# Patient Record
Sex: FEMALE | Race: BLACK OR AFRICAN AMERICAN | NOT HISPANIC OR LATINO | Employment: FULL TIME | URBAN - METROPOLITAN AREA
[De-identification: names, ages, dates, MRNs, and addresses within clinical notes are randomized per-mention and may not be internally consistent; named-entity substitution may affect disease eponyms.]

---

## 2021-07-01 ENCOUNTER — PATIENT MESSAGE (OUTPATIENT)
Dept: ADMINISTRATIVE | Facility: OTHER | Age: 36
End: 2021-07-01

## 2021-07-08 ENCOUNTER — HOSPITAL ENCOUNTER (EMERGENCY)
Facility: HOSPITAL | Age: 36
Discharge: HOME | End: 2021-07-08
Attending: EMERGENCY MEDICINE
Payer: COMMERCIAL

## 2021-07-08 VITALS
DIASTOLIC BLOOD PRESSURE: 67 MMHG | BODY MASS INDEX: 31.65 KG/M2 | TEMPERATURE: 98 F | HEART RATE: 85 BPM | SYSTOLIC BLOOD PRESSURE: 101 MMHG | OXYGEN SATURATION: 98 % | RESPIRATION RATE: 16 BRPM | WEIGHT: 190 LBS | HEIGHT: 65 IN

## 2021-07-08 DIAGNOSIS — L03.211 FACIAL CELLULITIS: Primary | ICD-10-CM

## 2021-07-08 LAB
B-HCG UR QL: NEGATIVE
CTP QC/QA: YES

## 2021-07-08 PROCEDURE — 63600175 PHARM REV CODE 636 W HCPCS: Performed by: EMERGENCY MEDICINE

## 2021-07-08 PROCEDURE — 25000003 PHARM REV CODE 250: Performed by: EMERGENCY MEDICINE

## 2021-07-08 PROCEDURE — 96372 THER/PROPH/DIAG INJ SC/IM: CPT

## 2021-07-08 PROCEDURE — 99284 EMERGENCY DEPT VISIT MOD MDM: CPT | Mod: 25

## 2021-07-08 PROCEDURE — 81025 URINE PREGNANCY TEST: CPT | Performed by: EMERGENCY MEDICINE

## 2021-07-08 RX ORDER — CEPHALEXIN 250 MG/1
500 CAPSULE ORAL
Status: COMPLETED | OUTPATIENT
Start: 2021-07-08 | End: 2021-07-08

## 2021-07-08 RX ORDER — CEPHALEXIN 500 MG/1
500 CAPSULE ORAL EVERY 6 HOURS
Qty: 28 CAPSULE | Refills: 0 | Status: SHIPPED | OUTPATIENT
Start: 2021-07-08 | End: 2021-07-15

## 2021-07-08 RX ORDER — KETOROLAC TROMETHAMINE 10 MG/1
10 TABLET, FILM COATED ORAL EVERY 6 HOURS
Qty: 8 TABLET | Refills: 0 | Status: SHIPPED | OUTPATIENT
Start: 2021-07-08 | End: 2021-07-10

## 2021-07-08 RX ORDER — MUPIROCIN 20 MG/G
OINTMENT TOPICAL 2 TIMES DAILY
COMMUNITY
Start: 2021-07-06

## 2021-07-08 RX ORDER — KETOROLAC TROMETHAMINE 30 MG/ML
30 INJECTION, SOLUTION INTRAMUSCULAR; INTRAVENOUS
Status: COMPLETED | OUTPATIENT
Start: 2021-07-08 | End: 2021-07-08

## 2021-07-08 RX ORDER — DOXYCYCLINE 100 MG/1
100 CAPSULE ORAL 2 TIMES DAILY
COMMUNITY
Start: 2021-07-06

## 2021-07-08 RX ADMIN — KETOROLAC TROMETHAMINE 30 MG: 30 INJECTION, SOLUTION INTRAMUSCULAR; INTRAVENOUS at 08:07

## 2021-07-08 RX ADMIN — CEPHALEXIN 500 MG: 250 CAPSULE ORAL at 08:07

## 2021-10-28 ENCOUNTER — OFFICE VISIT (OUTPATIENT)
Dept: URGENT CARE | Facility: CLINIC | Age: 36
End: 2021-10-28
Payer: COMMERCIAL

## 2021-10-28 VITALS
BODY MASS INDEX: 31.65 KG/M2 | HEIGHT: 65 IN | WEIGHT: 190 LBS | HEART RATE: 80 BPM | DIASTOLIC BLOOD PRESSURE: 72 MMHG | OXYGEN SATURATION: 98 % | TEMPERATURE: 98 F | SYSTOLIC BLOOD PRESSURE: 109 MMHG | RESPIRATION RATE: 18 BRPM
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
## 2021-10-28 DIAGNOSIS — M25.511 ACUTE PAIN OF RIGHT SHOULDER: ICD-10-CM

## 2021-10-28 DIAGNOSIS — M54.2 NECK PAIN: ICD-10-CM

## 2021-10-28 PROCEDURE — 3078F DIAST BP <80 MM HG: CPT | Mod: CPTII,S$GLB,, | Performed by: STUDENT IN AN ORGANIZED HEALTH CARE EDUCATION/TRAINING PROGRAM

## 2021-10-28 PROCEDURE — 3078F PR MOST RECENT DIASTOLIC BLOOD PRESSURE < 80 MM HG: ICD-10-PCS | Mod: CPTII,S$GLB,, | Performed by: STUDENT IN AN ORGANIZED HEALTH CARE EDUCATION/TRAINING PROGRAM

## 2021-10-28 PROCEDURE — 3008F BODY MASS INDEX DOCD: CPT | Mod: CPTII,S$GLB,, | Performed by: STUDENT IN AN ORGANIZED HEALTH CARE EDUCATION/TRAINING PROGRAM

## 2021-10-28 PROCEDURE — 99203 PR OFFICE/OUTPT VISIT, NEW, LEVL III, 30-44 MIN: ICD-10-PCS | Mod: S$GLB,,, | Performed by: STUDENT IN AN ORGANIZED HEALTH CARE EDUCATION/TRAINING PROGRAM

## 2021-10-28 PROCEDURE — 3074F SYST BP LT 130 MM HG: CPT | Mod: CPTII,S$GLB,, | Performed by: STUDENT IN AN ORGANIZED HEALTH CARE EDUCATION/TRAINING PROGRAM

## 2021-10-28 PROCEDURE — 72040 XR CERVICAL SPINE 2 OR 3 VIEWS: ICD-10-PCS | Mod: S$GLB,,, | Performed by: RADIOLOGY

## 2021-10-28 PROCEDURE — 72040 X-RAY EXAM NECK SPINE 2-3 VW: CPT | Mod: S$GLB,,, | Performed by: RADIOLOGY

## 2021-10-28 PROCEDURE — 1159F PR MEDICATION LIST DOCUMENTED IN MEDICAL RECORD: ICD-10-PCS | Mod: CPTII,S$GLB,, | Performed by: STUDENT IN AN ORGANIZED HEALTH CARE EDUCATION/TRAINING PROGRAM

## 2021-10-28 PROCEDURE — 3074F PR MOST RECENT SYSTOLIC BLOOD PRESSURE < 130 MM HG: ICD-10-PCS | Mod: CPTII,S$GLB,, | Performed by: STUDENT IN AN ORGANIZED HEALTH CARE EDUCATION/TRAINING PROGRAM

## 2021-10-28 PROCEDURE — 1160F PR REVIEW ALL MEDS BY PRESCRIBER/CLIN PHARMACIST DOCUMENTED: ICD-10-PCS | Mod: CPTII,S$GLB,, | Performed by: STUDENT IN AN ORGANIZED HEALTH CARE EDUCATION/TRAINING PROGRAM

## 2021-10-28 PROCEDURE — 1160F RVW MEDS BY RX/DR IN RCRD: CPT | Mod: CPTII,S$GLB,, | Performed by: STUDENT IN AN ORGANIZED HEALTH CARE EDUCATION/TRAINING PROGRAM

## 2021-10-28 PROCEDURE — 3008F PR BODY MASS INDEX (BMI) DOCUMENTED: ICD-10-PCS | Mod: CPTII,S$GLB,, | Performed by: STUDENT IN AN ORGANIZED HEALTH CARE EDUCATION/TRAINING PROGRAM

## 2021-10-28 PROCEDURE — 99203 OFFICE O/P NEW LOW 30 MIN: CPT | Mod: S$GLB,,, | Performed by: STUDENT IN AN ORGANIZED HEALTH CARE EDUCATION/TRAINING PROGRAM

## 2021-10-28 PROCEDURE — 1159F MED LIST DOCD IN RCRD: CPT | Mod: CPTII,S$GLB,, | Performed by: STUDENT IN AN ORGANIZED HEALTH CARE EDUCATION/TRAINING PROGRAM

## 2021-10-28 RX ORDER — METHOCARBAMOL 750 MG/1
750 TABLET, FILM COATED ORAL EVERY 6 HOURS PRN
Qty: 40 TABLET | Refills: 0 | Status: SHIPPED | OUTPATIENT
Start: 2021-10-28

## 2021-10-28 RX ORDER — IBUPROFEN 800 MG/1
800 TABLET ORAL EVERY 8 HOURS PRN
Qty: 20 TABLET | Refills: 0 | Status: SHIPPED | OUTPATIENT
Start: 2021-10-28 | End: 2023-09-13 | Stop reason: SDUPTHER

## 2022-06-23 ENCOUNTER — NURSE TRIAGE (OUTPATIENT)
Dept: ADMINISTRATIVE | Facility: CLINIC | Age: 37
End: 2022-06-23
Payer: COMMERCIAL

## 2022-06-23 NOTE — TELEPHONE ENCOUNTER
OOC outgoing call     - Pt c/o covid, symptoms amplyfy at night, nausea, cough ibuprofen, quarantine past 7 day, head and chest congestion, body aches, flu like symptoms. Covid protocol followed and pt advised to use [facility] marga to contact a [facility] doc for further tx her pcp is a [facility] doc [place]. Instructed Pt to go to an UC or ER if she cannot see a virtual doc today or her own pcp at [facility]. OAC offered and accepted. Unable to route encounter to non [place] pcp. Pt alert and oriented, education provided on how to tx symptoms at home but to contact OAC for further care. Pt agrees. Invited Pt to call ooc at 1 [phone] in the future if any new or worsening symptoms, or questions arise. Some criteria given for example on when to call ooc back.    Reason for Disposition   [1] Continuous (nonstop) coughing interferes with work or school AND [2] no improvement using cough treatment per Care Advice    Additional Information   Negative: SEVERE difficulty breathing (e.g., struggling for each breath, speaks in single words)   Negative: Difficult to awaken or acting confused (e.g., disoriented, slurred speech)   Negative: Bluish (or gray) lips or face now   Negative: Shock suspected (e.g., cold/pale/clammy skin, too weak to stand, low BP, rapid pulse)   Negative: Sounds like a life-threatening emergency to the triager   Negative: SEVERE or constant chest pain or pressure  (Exception: Mild central chest pain, present only when coughing.)   Negative: MODERATE difficulty breathing (e.g., speaks in phrases, SOB even at rest, pulse 100-120)   Negative: Headache and stiff neck (can't touch chin to chest)   Negative: Oxygen level (e.g., pulse oximetry) 90 percent or lower   Negative: Chest pain or pressure   Negative: Patient sounds very sick or weak to the triager   Negative: MILD difficulty breathing (e.g., minimal/no SOB at rest, SOB with walking, pulse <100)   Negative: Fever > 103 F (39.4 C)   Negative:  [1] Fever > 101 F (38.3 C) AND [2] over 60 years of age   Negative: [1] Fever > 100.0 F (37.8 C) AND [2] bedridden (e.g., nursing home patient, CVA, chronic illness, recovering from surgery)   Negative: HIGH RISK for severe COVID complications (e.g., weak immune system, age > 64 years, obesity with BMI > 25, pregnant, chronic lung disease or other chronic medical condition) (Exception: Already seen by PCP and no new or worsening symptoms.)   Negative: [1] HIGH RISK patient AND [2] influenza is widespread in the community AND [3] ONE OR MORE respiratory symptoms: cough, sore throat, runny or stuffy nose   Negative: [1] HIGH RISK patient AND [2] influenza exposure within the last 7 days AND [3] ONE OR MORE respiratory symptoms: cough, sore throat, runny or stuffy nose   Negative: Oxygen level (e.g., pulse oximetry) 91 to 94 percent   Negative: [1] COVID-19 infection suspected by caller or triager AND [2] mild symptoms (cough, fever, or others) AND [3] negative COVID-19 rapid test   Negative: Fever present > 3 days (72 hours)   Negative: [1] Fever returns after gone for over 24 hours AND [2] symptoms worse or not improved    Protocols used: CORONAVIRUS (COVID-19) DIAGNOSED OR [id number]

## 2023-09-13 ENCOUNTER — HOSPITAL ENCOUNTER (EMERGENCY)
Facility: HOSPITAL | Age: 38
Discharge: HOME | End: 2023-09-13
Attending: EMERGENCY MEDICINE
Payer: COMMERCIAL

## 2023-09-13 VITALS
OXYGEN SATURATION: 97 % | TEMPERATURE: 98 F | WEIGHT: 191.81 LBS | HEIGHT: 66 IN | BODY MASS INDEX: 30.82 KG/M2 | DIASTOLIC BLOOD PRESSURE: 68 MMHG | RESPIRATION RATE: 22 BRPM | HEART RATE: 90 BPM | SYSTOLIC BLOOD PRESSURE: 125 MMHG

## 2023-09-13 DIAGNOSIS — M25.511 ACUTE PAIN OF RIGHT SHOULDER: ICD-10-CM

## 2023-09-13 DIAGNOSIS — M54.2 NECK PAIN: ICD-10-CM

## 2023-09-13 DIAGNOSIS — S83.421A SPRAIN OF LATERAL COLLATERAL LIGAMENT OF RIGHT KNEE, INITIAL ENCOUNTER: Primary | ICD-10-CM

## 2023-09-13 DIAGNOSIS — M25.561 RIGHT KNEE PAIN: ICD-10-CM

## 2023-09-13 LAB
B-HCG UR QL: NEGATIVE
CTP QC/QA: YES

## 2023-09-13 PROCEDURE — 81025 URINE PREGNANCY TEST: CPT | Performed by: EMERGENCY MEDICINE

## 2023-09-13 PROCEDURE — 99283 EMERGENCY DEPT VISIT LOW MDM: CPT

## 2023-09-13 RX ORDER — IBUPROFEN 800 MG/1
800 TABLET ORAL EVERY 8 HOURS PRN
Qty: 20 TABLET | Refills: 0 | Status: SHIPPED | OUTPATIENT
Start: 2023-09-13

## 2023-09-14 NOTE — ED PROVIDER NOTES
"Encounter Date: 9/13/2023       History     Chief Complaint   Patient presents with    Knee Pain     Patient c/o right knee pain after feeling a "pop" in knee while playing.  Pain worse to outer aspect of knee.     HPI 37-year-old woman who presents emergency department for evaluation of acute onset of right knee pain after hearing a pop when she twisted her leg rough-housing with her family members.  Review of patient's allergies indicates:   Allergen Reactions    Bactrim [sulfamethoxazole-trimethoprim] Hives     No past medical history on file.  Past Surgical History:   Procedure Laterality Date    ECTOPIC PREGNANCY SURGERY       No family history on file.  Social History     Tobacco Use    Smoking status: Never    Smokeless tobacco: Never   Substance Use Topics    Alcohol use: No    Drug use: Never     Review of Systems   Constitutional:  Negative for fever.   HENT:  Negative for sore throat.    Respiratory:  Negative for shortness of breath.    Cardiovascular:  Negative for chest pain.   Gastrointestinal:  Negative for nausea.   Genitourinary:  Negative for dysuria.   Musculoskeletal:  Positive for arthralgias and gait problem. Negative for back pain.   Skin:  Negative for rash.   Neurological:  Negative for weakness.   Hematological:  Does not bruise/bleed easily.       Physical Exam     Initial Vitals [09/13/23 2105]   BP Pulse Resp Temp SpO2   125/68 90 (!) 22 97.9 °F (36.6 °C) 97 %      MAP       --         Physical Exam    Nursing note and vitals reviewed.  Constitutional: She appears well-developed and well-nourished. No distress.   HENT:   Head: Normocephalic and atraumatic.   Eyes: EOM are normal. Pupils are equal, round, and reactive to light.   Neck: Neck supple.   Pulmonary/Chest: No respiratory distress.   Musculoskeletal:         General: Normal range of motion.      Cervical back: Neck supple.      Comments: Patient with tenderness over the right lateral collateral ligament/proximal fibula.  There " is no appreciable swelling, bruising or erythema.  She has full range of motion although extending the knee causes increased pain.  I do not appreciate any laxity on varus/valgus stress.  Normal anterior/posterior drawer test.  She has normal sensation with normal pulses his sleep.     Neurological: She is alert and oriented to person, place, and time.   Skin: Skin is warm and dry.         ED Course   Procedures  Labs Reviewed   POCT URINE PREGNANCY - Normal          Imaging Results              X-Ray Knee 3 View Right (In process)                      Medications - No data to display  Medical Decision Making  37-year-old woman presents emergency department for evaluation of sudden onset of lateral right knee pain while rough-housing with her family members.  Patient states she felt a pop and had sudden pain.  On examination she is tender over the LCL of the right knee.  I do not appreciate any laxity on varus or valgus stress as well as negative anterior/posterior test.  Low suspicion for PCL/ACL tear.  She is neurovascularly intact.  X-rays are obtained showed no evidence of fracture or dislocation.  Patient is placed in knee immobilizer and provided crutches.  Suspect knee sprain, possible LCL injury.  Referred to PCP and Orthopedics.  NSAIDs for pain.    Amount and/or Complexity of Data Reviewed  Labs: ordered.  Radiology: ordered.    Risk  Prescription drug management.                               Clinical Impression:   Final diagnoses:  [M25.561] Right knee pain  [S83.421A] Sprain of lateral collateral ligament of right knee, initial encounter (Primary)        ED Disposition Condition    Discharge Stable          ED Prescriptions       Medication Sig Dispense Start Date End Date Auth. Provider    ibuprofen (ADVIL,MOTRIN) 800 MG tablet Take 1 tablet (800 mg total) by mouth every 8 (eight) hours as needed for Pain. 20 tablet 9/13/2023 -- Edin Ross MD          Follow-up Information       Follow up With  Specialties Details Why Contact Info Additional Information    Jessica Nava MD Obstetrics Schedule an appointment as soon as possible for a visit in 2 weeks  [address]   [place] [address]  [phone]       [facility] Emergency Medicine  As needed, If symptoms worsen [address] [place] [address] [address] [place] [address] 1st floor             Edin Ross MD  09/13/23 5902

## 2024-01-22 ENCOUNTER — OFFICE VISIT (OUTPATIENT)
Dept: INTERNAL MEDICINE | Facility: CLINIC | Age: 39
End: 2024-01-22
Payer: COMMERCIAL

## 2024-01-22 VITALS
DIASTOLIC BLOOD PRESSURE: 78 MMHG | BODY MASS INDEX: 33.72 KG/M2 | SYSTOLIC BLOOD PRESSURE: 112 MMHG | HEART RATE: 100 BPM | WEIGHT: 202.38 LBS | OXYGEN SATURATION: 95 % | HEIGHT: 65 IN

## 2024-01-22 DIAGNOSIS — N39.0 RECURRENT UTI: ICD-10-CM

## 2024-01-22 DIAGNOSIS — Z00.00 ANNUAL VISIT FOR GENERAL ADULT MEDICAL EXAMINATION WITHOUT ABNORMAL FINDINGS: Primary | ICD-10-CM

## 2024-01-22 DIAGNOSIS — M25.512 ACUTE PAIN OF LEFT SHOULDER: ICD-10-CM

## 2024-01-22 PROCEDURE — 90715 TDAP VACCINE 7 YRS/> IM: CPT | Mod: S$GLB,,, | Performed by: INTERNAL MEDICINE

## 2024-01-22 PROCEDURE — 3078F DIAST BP <80 MM HG: CPT | Mod: CPTII,S$GLB,,

## 2024-01-22 PROCEDURE — 3074F SYST BP LT 130 MM HG: CPT | Mod: CPTII,S$GLB,,

## 2024-01-22 PROCEDURE — 90471 IMMUNIZATION ADMIN: CPT | Mod: S$GLB,,, | Performed by: INTERNAL MEDICINE

## 2024-01-22 PROCEDURE — 99385 PREV VISIT NEW AGE 18-39: CPT | Mod: 25,S$GLB,,

## 2024-01-22 PROCEDURE — 99999 PR PBB SHADOW E&M-EST. PATIENT-LVL IV: CPT | Mod: PBBFAC,,,

## 2024-01-22 PROCEDURE — 3008F BODY MASS INDEX DOCD: CPT | Mod: CPTII,S$GLB,,

## 2024-01-22 RX ORDER — NITROFURANTOIN 25; 75 MG/1; MG/1
100 CAPSULE ORAL 2 TIMES DAILY
Qty: 14 CAPSULE | Refills: 0 | Status: SHIPPED | OUTPATIENT
Start: 2024-01-22 | End: 2024-01-29

## 2024-01-22 NOTE — PROGRESS NOTES
"Deena Mcdonald  1985        Subjective     Reason for Visit:    History of Present Illness:  Ms. Deena Mcdonald is a 38 y.o. female with a history pre-eclampsia  who presents to clinic for establishing of care, and further evaluation of re-current UTI and left sided shoulder pain. \    Recurrent UTI - states that as a child she had a surgery on a ureter to open to allow for proper flow of urine. States her whole life has had recurrent UTI. States she gets 3-4 UTI in a year. Notices recurrence occurring after sexual intercourse, she does use the restroom.     Left upper back pain- Started last week, more prominent on Saturday, has not occurred in the past. Throbbing, nagging pain. 5-6/10 at it's worse and at times would feel piercing. Took aspirin regular- helped a little. However, continued to have pain and pain radiated to back, and naging pain, tingling. Then she took baby aspirin and iron pill, and then took ibuprofen which helped and has since improved     Family History:   Mother and Siblings:Healthy  Maternal Grandparent: Grandmother passed from colon cancer, 64     Social history:  Occupation: Works in admin at the [employer]  [employer] health: "Stressed a little bit, kids and work"  Safety: Feels safe   Diet: Breakfast: omlette/eggs/cerna/bread Lunch: usually skips lunch Dinner: red beans/rice/jumbalaya. Salty food.   Exercise: Tore ACL on right, seeing at [facility]- recommending physical therapy and not surgical intervention at this time. Has not been going to physical therapy.   Smoking: denies  Alcohol: denies  Illicit drug use: denies  Sexual History: Currently sexually active, been diagnosed with herpes- treated. Last OBGYN appointment was last week- no abnormal PAP smears.    HEALTH MAINTENANCE:  - Lipid panel: will obtain  - Cervical cancer screen: Patient says she will share [facility] records    VACCINATIONS:  - Tdap- will obtain at this visit       Review of Systems " "  Constitutional:  Negative for chills and fever.   Eyes:  Negative for blurred vision.   Respiratory:  Negative for cough and shortness of breath.    Cardiovascular:  Negative for chest pain and leg swelling.   Gastrointestinal:  Negative for abdominal pain, diarrhea, nausea and vomiting.   Genitourinary:  Negative for dysuria.   Musculoskeletal:  Positive for back pain. Negative for myalgias.   Neurological:  Negative for sensory change, weakness and headaches.   Psychiatric/Behavioral:  The patient is not nervous/anxious.         PAST HISTORY:     No past medical history on file.    Past Surgical History:   Procedure Laterality Date    ECTOPIC PREGNANCY SURGERY         No family history on file.    Social History     Socioeconomic History    Marital status:    Tobacco Use    Smoking status: Never    Smokeless tobacco: Never   Substance and Sexual Activity    Alcohol use: No    Drug use: Never    Sexual activity: Yes       MEDICATIONS & ALLERGIES:     Current Outpatient Medications on File Prior to Visit   Medication Sig    doxycycline (VIBRAMYCIN) 100 MG Cap Take 100 mg by mouth 2 (two) times daily.    ibuprofen (ADVIL,MOTRIN) 800 MG tablet Take 1 tablet (800 mg total) by mouth every 8 (eight) hours as needed for Pain.    methocarbamoL (ROBAXIN) 750 MG Tab Take 1 tablet (750 mg total) by mouth every 6 (six) hours as needed (muscle spasms).    mupirocin (BACTROBAN) 2 % ointment Apply topically 2 (two) times daily.     No current facility-administered medications on file prior to visit.       Review of patient's allergies indicates:   Allergen Reactions    Bactrim [sulfamethoxazole-trimethoprim] Hives       OBJECTIVE:        Vital Signs:  Vitals:    01/22/24 1545   BP: 112/78   Pulse: 100   SpO2: 95%   Weight: 91.8 kg (202 lb 6.1 oz)   Height: 5' 5" (1.651 m)       Body mass index is 33.68 kg/m².     Physical Exam:  Physical Exam  Vitals reviewed.   Constitutional:       General: She is not in acute " "distress.     Appearance: Normal appearance. She is not ill-appearing.   HENT:      Head: Normocephalic and atraumatic.      Mouth/Throat:      Mouth: Mucous membranes are moist.   Eyes:      Extraocular Movements: Extraocular movements intact.      Conjunctiva/sclera: Conjunctivae normal.      Pupils: Pupils are equal, round, and reactive to light.   Cardiovascular:      Rate and Rhythm: Normal rate and regular rhythm.      Pulses: Normal pulses.      Heart sounds: Normal heart sounds.   Pulmonary:      Effort: Pulmonary effort is normal. No respiratory distress.      Breath sounds: Normal breath sounds. No wheezing.   Abdominal:      General: Abdomen is flat. Bowel sounds are normal. There is no distension.      Palpations: Abdomen is soft.      Tenderness: There is no abdominal tenderness. There is no guarding.   Musculoskeletal:      Right lower leg: No edema.      Left lower leg: No edema.   Skin:     General: Skin is warm.      Capillary Refill: Capillary refill takes less than 2 seconds.   Neurological:      General: No focal deficit present.      Mental Status: She is oriented to person, place, and time.      Motor: No weakness.   Psychiatric:         Mood and Affect: Mood normal.         Behavior: Behavior normal.            Laboratory  No results found for: "WBC", "HGB", "HCT", "MCV", "PLT"  No results found for: "GLU", "NA", "K", "CL", "CO2", "BUN", "CREATININE", "CALCIUM", "MG", "PROT", "BILITOT", "ALKPHOS", "ALT", "AST"  No results found for: "INR", "PROTIME"  No results found for: "CHOL", "HDL", "LDLCALC", "TRIG"  No results found for: "HGBA1C"  No results found for: "TSH"  No results for input(s): "POCTGLUCOSE" in the last 72 hours.       Health Maintenance         Date Due Completion Date    Hepatitis C Screening Never done ---    Cervical Cancer Screening Never done ---    HIV Screening Never done ---    Hemoglobin A1c (Diabetic Prevention Screening) Never done ---    TETANUS VACCINE 01/22/2034 " 1/22/2024                ASSESSMENT & PLAN:       Ms. Deena Mcdonald is a 38 y.o. female who was seen today in clinic for establishing care and had concerns for recurrent UTI, states she had some type of urological procedure as a child for UTIs however she continued to have recurrent UTIs. Mostly notable after coitus. Will prescribe postcoital nitrofurantoin and referral for urology. Patient says she will transfer her records [facility].     Deena was seen today for establish care.    Diagnoses and all orders for this visit:    Annual visit for general adult medical examination without abnormal findings  -     CBC Auto Differential; Future  -     Comprehensive Metabolic Panel; Future  -     Hemoglobin A1C; Future  -     Lipid Panel; Future  -     TSH; Future  -     Hepatitis C Antibody; Future  -     HIV 1/2 Ag/Ab (4th Gen); Future  -     (In Office Administered) Tdap Vaccine    Recurrent UTI  -     Ambulatory referral/consult to Urology; Future  -     nitrofurantoin, macrocrystal-monohydrate, (MACROBID) 100 MG capsule; Take 1 capsule (100 mg total) by mouth 2 (two) times daily. for 7 days    Acute pain of left shoulder  -     Ambulatory referral/consult to Medical Massage Therapy; Future  -     Ambulatory referral/consult to Physical/Occupational Therapy; Future      1. Annual visit for general adult medical examination without abnormal findings    2. Recurrent UTI    3. Acute pain of left shoulder        No follow-ups on file.    Other Orders Placed This Visit:  Orders Placed This Encounter   Procedures    (In Office Administered) Tdap Vaccine    CBC Auto Differential    Comprehensive Metabolic Panel    Hemoglobin A1C    Lipid Panel    TSH    Hepatitis C Antibody    HIV 1/2 Ag/Ab (4th Gen)    Ambulatory referral/consult to Medical Massage Therapy    Ambulatory referral/consult to Physical/Occupational Therapy    Ambulatory referral/consult to Urology           Discussed with Dr. Ugarte - staff  attestation to follow    Vanessa Pelaez MD  Internal Medicine, PGY-2  01/23/2024.3:49 PM  [facility]  Internal Medicine Resident Clinic  [address] [place] [address]  [phone]  [email or web address]

## 2024-02-06 ENCOUNTER — TELEPHONE (OUTPATIENT)
Dept: INTERNAL MEDICINE | Facility: CLINIC | Age: 39
End: 2024-02-06
Payer: COMMERCIAL

## 2024-02-06 NOTE — TELEPHONE ENCOUNTER
----- Message from Narendra Renee sent at 2/6/2024  8:29 AM CST -----  Contact: [phone]@patient  Good morning patient would like a call back to discuss getting a apt to remove stiches on 2/14. Please give patient a call back [phone]

## 2024-02-14 ENCOUNTER — OFFICE VISIT (OUTPATIENT)
Dept: INTERNAL MEDICINE | Facility: CLINIC | Age: 39
End: 2024-02-14
Payer: COMMERCIAL

## 2024-02-14 VITALS
HEART RATE: 72 BPM | OXYGEN SATURATION: 95 % | SYSTOLIC BLOOD PRESSURE: 99 MMHG | HEIGHT: 66 IN | WEIGHT: 199.06 LBS | DIASTOLIC BLOOD PRESSURE: 76 MMHG | BODY MASS INDEX: 31.99 KG/M2

## 2024-02-14 DIAGNOSIS — S51.811A LACERATION OF RIGHT FOREARM, INITIAL ENCOUNTER: Primary | ICD-10-CM

## 2024-02-14 PROCEDURE — 99999 PR PBB SHADOW E&M-EST. PATIENT-LVL III: CPT | Mod: PBBFAC,,,

## 2024-02-14 PROCEDURE — 3074F SYST BP LT 130 MM HG: CPT | Mod: CPTII,S$GLB,,

## 2024-02-14 PROCEDURE — 3008F BODY MASS INDEX DOCD: CPT | Mod: CPTII,S$GLB,,

## 2024-02-14 PROCEDURE — 3078F DIAST BP <80 MM HG: CPT | Mod: CPTII,S$GLB,,

## 2024-02-14 PROCEDURE — 99213 OFFICE O/P EST LOW 20 MIN: CPT | Mod: S$GLB,,,

## 2024-02-14 NOTE — PROGRESS NOTES
"    [facility]  RESIDENT CONTINUITY CLINIC NOTE    Name: Deena Mcdonald  : 1985  MRN: [id number]  Date of Service: 2024   PCP: Vanessa Pelaez MD        Assessment and Plan:       Laceration of right forearm, initial encounter  -     Stitches removed; appears to be healing well but given the depth of the initial injury and her complaint of "feeling something pulling on the inside", I'm placing an ambulatory referral/consult to Orthopedics; Future; Expected date: 2024      Preventative Health: Per PCP      HPI:           Deena Mcdonald is a 38 y.o. female with no significant medical history who presents to clinic for stitch removal.  Her R forearm just proximal to the wrist was lacerated on  on a broken light bulb on the side of her [employer] [place] float upon her pulling her hand up after reaching down to hand someone some beads.  The initial appearance is depicted below.  [facility] and she says they applied a cream-based antibiotic (she thinks it was mupirocin).  The pain has been tolerable but she still feels "discomfort on the inside" of her arm when moving it.    Review of systems as above; all unmentioned systems are negative.     PEX:     Vitals:    24 1453   BP: 99/76   BP Location: Left arm   Patient Position: Sitting   BP Method: Medium (Manual)   Pulse: 72   SpO2: 95%   Weight: 90.3 kg (199 lb 1.2 oz)   Height: 5' 5.5" (1.664 m)     Body mass index is 32.62 kg/m².    Physical exam: R hand and wrist neurovascularly intact.  Oropharynx is clear.  No adenopathy.  Lungs CTA.  No murmurs, gallops, or rubs.  No abdominal tenderness.  No MAJOR.    :      :            Other pertinent data from chart that formed part of my MDM:             Current Outpatient Medications:     doxycycline (VIBRAMYCIN) 100 MG Cap, Take 100 mg by mouth 2 (two) times daily., Disp: , Rfl:     " "ibuprofen (ADVIL,MOTRIN) 800 MG tablet, Take 1 tablet (800 mg total) by mouth every 8 (eight) hours as needed for Pain., Disp: 20 tablet, Rfl: 0    methocarbamoL (ROBAXIN) 750 MG Tab, Take 1 tablet (750 mg total) by mouth every 6 (six) hours as needed (muscle spasms)., Disp: 40 tablet, Rfl: 0    mupirocin (BACTROBAN) 2 % ointment, Apply topically 2 (two) times daily., Disp: , Rfl:    No past medical history on file.  Past Surgical History:   Procedure Laterality Date    ECTOPIC PREGNANCY SURGERY       No family history on file.  Social History     Socioeconomic History    Marital status:    Tobacco Use    Smoking status: Never    Smokeless tobacco: Never   Substance and Sexual Activity    Alcohol use: No    Drug use: Never    Sexual activity: Yes       Labs: Previous labs reviewed.  No results found for: "WBC", "HGB", "HCT", "MCV", "PLT"      No results found for: "NA", "K", "CL", "CO2", "GLU", "BUN", "CREATININE", "CALCIUM", "PROT", "ALBUMIN", "BILITOT", "ALKPHOS", "AST", "ALT", "ANIONGAP", "EGFRNORACEVR"    Imaging: Previous imaging reviewed.     Discussed with Dr. Gonzalez, further recommendations as per attending addendum. Please feel free to contact me with any questions or concerns.    Guillermo Jackson MD MPH  Resident Continuity Clinic, PGY-III  [facility] [address] [address]  [phone]      "

## 2024-02-15 ENCOUNTER — TELEPHONE (OUTPATIENT)
Dept: INTERNAL MEDICINE | Facility: CLINIC | Age: 39
End: 2024-02-15
Payer: COMMERCIAL

## 2024-02-15 DIAGNOSIS — M79.631 RIGHT FOREARM PAIN: Primary | ICD-10-CM

## 2024-02-15 NOTE — PROGRESS NOTES
Preceptor note    Patient's history and physical discussed, please refer to resident physician's note for specific details. Pt seen and examined with resident physician. Wound examined after stitches removed. Overall appears to be healing well except one area where skin is has not yet closed. Medical record reviewed. I agree with resident's assessment and plan.

## 2024-02-19 ENCOUNTER — OFFICE VISIT (OUTPATIENT)
Dept: UROLOGY | Facility: CLINIC | Age: 39
End: 2024-02-19
Payer: COMMERCIAL

## 2024-02-19 VITALS
SYSTOLIC BLOOD PRESSURE: 126 MMHG | BODY MASS INDEX: 33.57 KG/M2 | HEART RATE: 76 BPM | HEIGHT: 65 IN | WEIGHT: 201.5 LBS | DIASTOLIC BLOOD PRESSURE: 70 MMHG | RESPIRATION RATE: 18 BRPM

## 2024-02-19 DIAGNOSIS — N39.0 RECURRENT UTI: ICD-10-CM

## 2024-02-19 PROCEDURE — 3008F BODY MASS INDEX DOCD: CPT | Mod: CPTII,S$GLB,, | Performed by: NURSE PRACTITIONER

## 2024-02-19 PROCEDURE — 3074F SYST BP LT 130 MM HG: CPT | Mod: CPTII,S$GLB,, | Performed by: NURSE PRACTITIONER

## 2024-02-19 PROCEDURE — 1159F MED LIST DOCD IN RCRD: CPT | Mod: CPTII,S$GLB,, | Performed by: NURSE PRACTITIONER

## 2024-02-19 PROCEDURE — 99203 OFFICE O/P NEW LOW 30 MIN: CPT | Mod: S$GLB,,, | Performed by: NURSE PRACTITIONER

## 2024-02-19 PROCEDURE — 3078F DIAST BP <80 MM HG: CPT | Mod: CPTII,S$GLB,, | Performed by: NURSE PRACTITIONER

## 2024-02-19 PROCEDURE — 1160F RVW MEDS BY RX/DR IN RCRD: CPT | Mod: CPTII,S$GLB,, | Performed by: NURSE PRACTITIONER

## 2024-02-19 RX ORDER — NITROFURANTOIN MACROCRYSTALS 50 MG/1
50 CAPSULE ORAL NIGHTLY
Qty: 90 CAPSULE | Refills: 1 | Status: SHIPPED | OUTPATIENT
Start: 2024-02-19 | End: 2024-08-17

## 2024-02-19 NOTE — PROGRESS NOTES
"Subjective:      Deena Mcdonald is a 38 y.o. female who was referred by Dr. Pelaez for evaluation of her recurrent UTIs.      Recurrent UTIs  Patient complains of recurrent urinary tract infections.  These have been occuring for >20 years and she reports 5 infections in the past year.  Of her recent infections,  all  are culture proven, including unknown organism.  She describes associated symptoms during these episodes as dysuria, frequency, urgency and L flank pain. Often with gross hematuria.  She denies associated fever. She reports that symptoms improve with antibiotic treatment.  The timing of her infections is not related to intercourse.  Other treatments have included none antibiotics.  Other urologic history includes- urethral procedure as a child (dilation?).     She denies urinary symptoms today. Denies gross hematuria.     The following portions of the patient's history were reviewed and updated as appropriate: allergies, current medications, past family history, past medical history, past social history, past surgical history and problem list.    Review of Systems  Constitutional: no fever or chills  ENT: no nasal congestion or sore throat  Respiratory: no cough or shortness of breath  Cardiovascular: no chest pain or palpitations  Gastrointestinal: no nausea or vomiting, tolerating diet  Genitourinary: as per HPI  Hematologic/Lymphatic: no easy bruising or lymphadenopathy  Musculoskeletal: no arthralgias or myalgias  Neurological: no seizures or tremors  Behavioral/Psych: no auditory or visual hallucinations     Objective:   Vital Signs:/70 (BP Location: Left arm, Patient Position: Sitting, BP Method: Medium (Automatic))   Pulse 76   Resp 18   Ht 5' 5" (1.651 m)   Wt 91.4 kg (201 lb 8 oz)   LMP 02/12/2024 (Exact Date)   BMI 33.53 kg/m²     Physical Exam   General: alert and oriented, no acute distress  Head: normocephalic, atraumatic  Neck: supple, normal ROM  Respiratory: Symmetric " "expansion, non-labored breathing  Cardiovascular: regular rate and rhythm  Abdomen: soft, non tender, non distended  Pelvic: deferred  Skin: normal coloration and turgor, no rashes, no suspicious skin lesions noted  Neuro: alert and oriented x3, no gross deficits  Psych: normal judgment and insight, normal mood/affect, and non-anxious    Lab Review   Urinalysis demonstrates negative for all components  No results found for: "WBC", "HGB", "HCT", "MCV", "PLT"  No results found for: "CREATININE", "BUN"    Imaging   None    Assessment:     1. Recurrent UTI      Plan:   Diagnoses and all orders for this visit:    Recurrent UTI  -     Ambulatory referral/consult to Urology  -     Urine culture  -     Urine culture; Standing  -     US Retroperitoneal Complete; Future  -     Cystoscopy; Future  -     nitrofurantoin (MACRODANTIN) 50 MG capsule; Take 1 capsule (50 mg total) by mouth every evening.    Plan:  Discussed various etiologies for recurrent UTIs as well as the difference between recurrent and persistent UTIs. Explained that without her outside records, I'm unable to determine which is the case with her.  Renal US to r/o hydro and/or stones.  Will send urine culture today and treat as indicated  Expressed importance of obtaining culture any time she thinks she has UTI - standing order placed for UCx   Discussed preventive measures including adequate hydration, d-mannose/probiotic, regular voiding, and voiding after intercourse.  Discussed prophylactic antibiotics, which she wishes to start. Prescription provided forMacrodantin 50mg daily.  FU after US for cysto      "

## 2024-02-29 ENCOUNTER — TELEPHONE (OUTPATIENT)
Dept: ORTHOPEDICS | Facility: CLINIC | Age: 39
End: 2024-02-29
Payer: COMMERCIAL

## 2024-02-29 NOTE — TELEPHONE ENCOUNTER
LVM c pt. Attempting to confirm appt location & time c Dr. Patel   with XR. Requested a call back to the [facility] at [phone] with any questions, concerns or need for a different appointment time.

## 2024-03-01 ENCOUNTER — TELEPHONE (OUTPATIENT)
Dept: UROLOGY | Facility: CLINIC | Age: 39
End: 2024-03-01
Payer: COMMERCIAL

## 2024-03-04 ENCOUNTER — TELEPHONE (OUTPATIENT)
Dept: UROLOGY | Facility: CLINIC | Age: 39
End: 2024-03-04
Payer: COMMERCIAL

## 2024-03-05 ENCOUNTER — TELEPHONE (OUTPATIENT)
Dept: UROLOGY | Facility: CLINIC | Age: 39
End: 2024-03-05
Payer: COMMERCIAL

## 2024-03-05 NOTE — TELEPHONE ENCOUNTER
Appt r/s.  ----- Message from Hang Palma sent at 3/4/2024  2:27 PM CST -----  Name Of Caller: Deena        Provider Name: Juan Alberto Coburn         Does patient feel the need to be seen today? no        Relationship to the Pt?: patient        Contact Preference?:[phone]         What is the nature of the call?:Patient has a cystoscopy scheduled for today 3- at 2:30pm, patient states that she needs to cancel this appointment and get it rescheduled.

## 2024-03-13 ENCOUNTER — TELEPHONE (OUTPATIENT)
Dept: ORTHOPEDICS | Facility: CLINIC | Age: 39
End: 2024-03-13
Payer: COMMERCIAL

## 2024-03-13 NOTE — TELEPHONE ENCOUNTER
I called the patient and reminded them of their appointment on 03/19/2024 at 3:45 p.m. I informed them where they need to go and to arrive 45 minutes before their appointment for their XR. The patient understood and agreed.

## 2024-04-10 ENCOUNTER — TELEPHONE (OUTPATIENT)
Dept: ORTHOPEDICS | Facility: CLINIC | Age: 39
End: 2024-04-10
Payer: COMMERCIAL

## 2024-04-16 ENCOUNTER — OFFICE VISIT (OUTPATIENT)
Dept: ORTHOPEDICS | Facility: CLINIC | Age: 39
End: 2024-04-16
Payer: COMMERCIAL

## 2024-04-16 ENCOUNTER — HOSPITAL ENCOUNTER (OUTPATIENT)
Dept: RADIOLOGY | Facility: OTHER | Age: 39
Discharge: HOME | End: 2024-04-16
Attending: ORTHOPAEDIC SURGERY
Payer: COMMERCIAL

## 2024-04-16 VITALS — WEIGHT: 201.5 LBS | BODY MASS INDEX: 33.57 KG/M2 | HEIGHT: 65 IN

## 2024-04-16 DIAGNOSIS — M79.631 RIGHT FOREARM PAIN: ICD-10-CM

## 2024-04-16 DIAGNOSIS — S51.811A LACERATION OF RIGHT FOREARM, INITIAL ENCOUNTER: ICD-10-CM

## 2024-04-16 PROCEDURE — 99999 PR PBB SHADOW E&M-EST. PATIENT-LVL III: CPT | Mod: PBBFAC,,, | Performed by: ORTHOPAEDIC SURGERY

## 2024-04-16 PROCEDURE — 99204 OFFICE O/P NEW MOD 45 MIN: CPT | Mod: S$GLB,,, | Performed by: ORTHOPAEDIC SURGERY

## 2024-04-16 PROCEDURE — 1159F MED LIST DOCD IN RCRD: CPT | Mod: CPTII,S$GLB,, | Performed by: ORTHOPAEDIC SURGERY

## 2024-04-16 PROCEDURE — 73090 X-RAY EXAM OF FOREARM: CPT | Mod: TC,FY,RT

## 2024-04-16 PROCEDURE — 3008F BODY MASS INDEX DOCD: CPT | Mod: CPTII,S$GLB,, | Performed by: ORTHOPAEDIC SURGERY

## 2024-04-16 PROCEDURE — 73090 X-RAY EXAM OF FOREARM: CPT | Mod: 26,RT,, | Performed by: RADIOLOGY

## 2024-04-16 NOTE — PROGRESS NOTES
H&P  Orthopaedics    SUBJECTIVE:   Chief Complaint: R forearm laceration     History of Present Illness:  Patient is a 38 y.o. female who presents with 2.5 months ago had a broken light bulb cut the volar aspect of R distal forearm.  Denied pulsatile bleeding at that time or any foreign bodies, but endorsed that she can see her muscle belly.  Seen in ED same day of injury at [facility] and was I&D'd then closed.  She did not think that they closed the fascia, but just closed the skin.  Endorses that her wound healed very well without any concerns for infection.  Her primary complaint is a generalized pain with severe activity and lifting right over the scar that is worse with wrist extension.  Denies any weakness or numbness.  Endorses full function of her hand.  Does not prevent her from performing her job or ADLs, but only is hypersensitive and painful to certain activities which is wrist extension with radial deviation.    No hx of therapy, HEP, bracing, CSI, or Sx to RUE. They are LHD. Works as a  at the [employer].    Scheduled Meds:  Current Outpatient Medications   Medication Sig Dispense Refill    doxycycline (VIBRAMYCIN) 100 MG Cap Take 100 mg by mouth 2 (two) times daily.      ibuprofen (ADVIL,MOTRIN) 800 MG tablet Take 1 tablet (800 mg total) by mouth every 8 (eight) hours as needed for Pain. 20 tablet 0    methocarbamoL (ROBAXIN) 750 MG Tab Take 1 tablet (750 mg total) by mouth every 6 (six) hours as needed (muscle spasms). 40 tablet 0    mupirocin (BACTROBAN) 2 % ointment Apply topically 2 (two) times daily.      nitrofurantoin (MACRODANTIN) 50 MG capsule Take 1 capsule (50 mg total) by mouth every evening. 90 capsule 1    phentermine (ADIPEX-P) 37.5 mg tablet Take 37.5 mg by mouth.       No current facility-administered medications for this visit.     Continuous Infusions:  Current Outpatient Medications   Medication Sig Dispense Refill    doxycycline (VIBRAMYCIN) 100 MG Cap Take 100 mg by mouth 2 (two)  times daily.      ibuprofen (ADVIL,MOTRIN) 800 MG tablet Take 1 tablet (800 mg total) by mouth every 8 (eight) hours as needed for Pain. 20 tablet 0    methocarbamoL (ROBAXIN) 750 MG Tab Take 1 tablet (750 mg total) by mouth every 6 (six) hours as needed (muscle spasms). 40 tablet 0    mupirocin (BACTROBAN) 2 % ointment Apply topically 2 (two) times daily.      nitrofurantoin (MACRODANTIN) 50 MG capsule Take 1 capsule (50 mg total) by mouth every evening. 90 capsule 1    phentermine (ADIPEX-P) 37.5 mg tablet Take 37.5 mg by mouth.       No current facility-administered medications for this visit.     PRN Meds:  Current Outpatient Medications   Medication Sig Dispense Refill    doxycycline (VIBRAMYCIN) 100 MG Cap Take 100 mg by mouth 2 (two) times daily.      ibuprofen (ADVIL,MOTRIN) 800 MG tablet Take 1 tablet (800 mg total) by mouth every 8 (eight) hours as needed for Pain. 20 tablet 0    methocarbamoL (ROBAXIN) 750 MG Tab Take 1 tablet (750 mg total) by mouth every 6 (six) hours as needed (muscle spasms). 40 tablet 0    mupirocin (BACTROBAN) 2 % ointment Apply topically 2 (two) times daily.      nitrofurantoin (MACRODANTIN) 50 MG capsule Take 1 capsule (50 mg total) by mouth every evening. 90 capsule 1    phentermine (ADIPEX-P) 37.5 mg tablet Take 37.5 mg by mouth.       No current facility-administered medications for this visit.       Review of patient's allergies indicates:   Allergen Reactions    Sulfamethoxazole-trimethoprim Hives       No past medical history on file.  Past Surgical History:   Procedure Laterality Date    ECTOPIC PREGNANCY SURGERY       No family history on file.  Social History     Tobacco Use    Smoking status: Never    Smokeless tobacco: Never   Substance Use Topics    Alcohol use: No    Drug use: Never        Review of Systems:  Patient denies constitutional symptoms, cardiac symptoms, respiratory symptoms, GI symptoms.  The remainder of the musculoskeletal ROS is included in the  HPI.      OBJECTIVE:     Physical Exam:  Gen:  No acute distress  CV:  Peripherally well-perfused.  Pulses 2+ bilaterally.  Lungs:  Normal respiratory effort.  Abdomen:  Soft, non-tender, non-distended  Head/Neck:  Normocephalic.  Atraumatic. No TTP, AROM and PROM intact without pain  Neuro:  CN intact without deficit, SILT throughout B/L Upper & Lower Extremities     Right Hand/Wrist Examination:     Observation/Inspection:  Swelling                       none                  Deformity                     none  Discoloration               none                  Scars                           healed 4 cm oblique scar over the volar ulnar distal forearm.  No surrounding erythema, swelling, palpable fluid collections.  No palpable fascial rent with active wrist flexion with ulnar deviation.  Tender to palpation directly over the scar, but not adjacent to surrounding tissues.  The scar appears immobile to the fascial tissues.                 Atrophy                        none    Finkelstein's Test   Neg  WHAT Test    Neg  Snuff box tenderness   Neg  Baldwin's Test    Neg  Hook of [place]ate Tenderness  Neg  CMC grind    Neg  Circumduction test   Neg     Neurovascular Exam:  Digits WWP, brisk CR < 3s throughout  NVI motor/LTS to M/R/U nerves, radial pulse 2+  Tinel's Test - Carpal Tunnel  Neg  Tinel's Test - Cubital Tunnel  Neg  Phalen's Test    Neg  Median Nerve Compression Test Neg     Pain with active and passive wrist extension with radial deviation over the scar.  ROM hand full, painless.     ROM wrist full, painless    ROM elbow full, painless     Abdomen not guarded  Respirations nonlabored  Perfusion intact    Diagnostic Results:    Imaging - I independently viewed the patient's imaging as well as the radiology report.  Xrays of the patient's Right forearm demonstrate no evidence of any acute fractures or dislocations or significant degenerative changes. No foreign body or subcutaneous  emphysema.    ASSESSMENT/PLAN:     A/P: Deena Mcdonald is a 38 y.o. who presents with 2.5-month-old right volar ulnar distal forearm laceration without evidence of tendon or neurovascular injury.  Scar has healed, but I believe she has an adherent scar that is the primary cause of her pain.      Plan:  - Discussed conservative versus nonconservative options with the patient.  At this point I do not think that there is any residual infection, fascial rent, or foreign body based on her exam.  However I think the patient would benefit from some form of scar massage to help break up the adhered superficial tissues to her fascia.  We did discuss possible scar revision, but patient would like to avoid surgery if possible which I think is reasonable.  She would like to proceed with her own form of scar massage.  She was educated on how to do this in clinic today.  She would like to follow up PRN or if in the event it was not improve to referral to OT versus obtaining MRI.      Mannie Barrera MD  Orthopaedic Surgery Resident

## 2024-04-19 NOTE — PROGRESS NOTES
A/P: Deena Mcdonald is a 38 y.o. who presents with 2.5-month-old right volar ulnar distal forearm laceration without evidence of tendon or neurovascular injury.  Scar has healed, but I believe she has an adherent scar that is the primary cause of her pain.        Plan:  - Discussed conservative versus nonconservative options with the patient.  At this point I do not think that there is any residual infection, fascial rent, or foreign body based on her exam.  However I think the patient would benefit from some form of scar massage to help break up the adhered superficial tissues to her fascia.  We did discuss possible scar revision, but patient would like to avoid surgery if possible which I think is reasonable.  She would like to proceed with her own form of scar massage.  She was educated on how to do this in clinic today.  She would like to follow up PRN or if in the event it was not improve to referral to OT versus obtaining MRI

## 2025-03-10 ENCOUNTER — TELEPHONE (OUTPATIENT)
Dept: INTERNAL MEDICINE | Facility: CLINIC | Age: 40
End: 2025-03-10
Payer: COMMERCIAL

## 2025-03-12 ENCOUNTER — OFFICE VISIT (OUTPATIENT)
Dept: INTERNAL MEDICINE | Facility: CLINIC | Age: 40
End: 2025-03-12
Payer: COMMERCIAL

## 2025-03-12 ENCOUNTER — LAB VISIT (OUTPATIENT)
Dept: LAB | Facility: HOSPITAL | Age: 40
End: 2025-03-12
Payer: COMMERCIAL

## 2025-03-12 VITALS
HEIGHT: 66 IN | SYSTOLIC BLOOD PRESSURE: 118 MMHG | WEIGHT: 191.56 LBS | BODY MASS INDEX: 30.79 KG/M2 | DIASTOLIC BLOOD PRESSURE: 72 MMHG

## 2025-03-12 DIAGNOSIS — N39.0 RECURRENT UTI: ICD-10-CM

## 2025-03-12 DIAGNOSIS — Z98.890 HISTORY OF REPAIR OF ACL: ICD-10-CM

## 2025-03-12 DIAGNOSIS — Z00.00 PREVENTATIVE HEALTH CARE: Primary | ICD-10-CM

## 2025-03-12 DIAGNOSIS — Z00.00 PREVENTATIVE HEALTH CARE: ICD-10-CM

## 2025-03-12 PROBLEM — O14.90 PREECLAMPSIA: Status: ACTIVE | Noted: 2025-03-12

## 2025-03-12 LAB
ALBUMIN SERPL BCP-MCNC: 4.3 G/DL (ref 3.5–5.2)
ALP SERPL-CCNC: 57 U/L (ref 40–150)
ALT SERPL W/O P-5'-P-CCNC: 26 U/L (ref 10–44)
ANION GAP SERPL CALC-SCNC: 11 MMOL/L (ref 8–16)
AST SERPL-CCNC: 28 U/L (ref 10–40)
BASOPHILS # BLD AUTO: 0.02 K/UL (ref 0–0.2)
BASOPHILS NFR BLD: 0.3 % (ref 0–1.9)
BILIRUB SERPL-MCNC: 0.4 MG/DL (ref 0.1–1)
BUN SERPL-MCNC: 6 MG/DL (ref 6–20)
CALCIUM SERPL-MCNC: 9.8 MG/DL (ref 8.7–10.5)
CHLORIDE SERPL-SCNC: 106 MMOL/L (ref 95–110)
CHOLEST SERPL-MCNC: 222 MG/DL (ref 120–199)
CHOLEST/HDLC SERPL: 3.6 {RATIO} (ref 2–5)
CO2 SERPL-SCNC: 23 MMOL/L (ref 23–29)
CREAT SERPL-MCNC: 0.8 MG/DL (ref 0.5–1.4)
DIFFERENTIAL METHOD BLD: ABNORMAL
EOSINOPHIL # BLD AUTO: 0 K/UL (ref 0–0.5)
EOSINOPHIL NFR BLD: 0.6 % (ref 0–8)
ERYTHROCYTE [DISTWIDTH] IN BLOOD BY AUTOMATED COUNT: 13.3 % (ref 11.5–14.5)
EST. GFR  (NO RACE VARIABLE): >60 ML/MIN/1.73 M^2
ESTIMATED AVG GLUCOSE: 123 MG/DL (ref 68–131)
GLUCOSE SERPL-MCNC: 86 MG/DL (ref 70–110)
HBA1C MFR BLD: 5.9 % (ref 4–5.6)
HCT VFR BLD AUTO: 41.2 % (ref 37–48.5)
HDLC SERPL-MCNC: 61 MG/DL (ref 40–75)
HDLC SERPL: 27.5 % (ref 20–50)
HGB BLD-MCNC: 12.7 G/DL (ref 12–16)
IMM GRANULOCYTES # BLD AUTO: 0.01 K/UL (ref 0–0.04)
IMM GRANULOCYTES NFR BLD AUTO: 0.2 % (ref 0–0.5)
LDLC SERPL CALC-MCNC: 136.6 MG/DL (ref 63–159)
LYMPHOCYTES # BLD AUTO: 2.1 K/UL (ref 1–4.8)
LYMPHOCYTES NFR BLD: 33.8 % (ref 18–48)
MCH RBC QN AUTO: 28.2 PG (ref 27–31)
MCHC RBC AUTO-ENTMCNC: 30.8 G/DL (ref 32–36)
MCV RBC AUTO: 92 FL (ref 82–98)
MONOCYTES # BLD AUTO: 0.6 K/UL (ref 0.3–1)
MONOCYTES NFR BLD: 8.9 % (ref 4–15)
NEUTROPHILS # BLD AUTO: 3.5 K/UL (ref 1.8–7.7)
NEUTROPHILS NFR BLD: 56.2 % (ref 38–73)
NONHDLC SERPL-MCNC: 161 MG/DL
NRBC BLD-RTO: 0 /100 WBC
PLATELET # BLD AUTO: 281 K/UL (ref 150–450)
PMV BLD AUTO: 10.5 FL (ref 9.2–12.9)
POTASSIUM SERPL-SCNC: 4.3 MMOL/L (ref 3.5–5.1)
PROT SERPL-MCNC: 8.2 G/DL (ref 6–8.4)
RBC # BLD AUTO: 4.5 M/UL (ref 4–5.4)
SODIUM SERPL-SCNC: 140 MMOL/L (ref 136–145)
TRIGL SERPL-MCNC: 122 MG/DL (ref 30–150)
TSH SERPL DL<=0.005 MIU/L-ACNC: 0.52 UIU/ML (ref 0.4–4)
WBC # BLD AUTO: 6.19 K/UL (ref 3.9–12.7)

## 2025-03-12 PROCEDURE — 36415 COLL VENOUS BLD VENIPUNCTURE: CPT

## 2025-03-12 PROCEDURE — 84443 ASSAY THYROID STIM HORMONE: CPT

## 2025-03-12 PROCEDURE — 80053 COMPREHEN METABOLIC PANEL: CPT

## 2025-03-12 PROCEDURE — 99999 PR PBB SHADOW E&M-EST. PATIENT-LVL II: CPT | Mod: PBBFAC,,,

## 2025-03-12 PROCEDURE — 83036 HEMOGLOBIN GLYCOSYLATED A1C: CPT

## 2025-03-12 PROCEDURE — 85025 COMPLETE CBC W/AUTO DIFF WBC: CPT

## 2025-03-12 PROCEDURE — 80061 LIPID PANEL: CPT

## 2025-03-12 NOTE — PROGRESS NOTES
Deena Mcdonald  1985        Subjective     Reason for Visit:    History of Present Illness:  Ms. Deena Mcdonald is a 39 y.o. female with a history of pre-eclampsia, ACL repair who presents to clinic for annual. Last seen on 1/22/24 for establishing of care.     Patient states that her grandmother was diagnosed 45/46 when she was first diagnosed with colorectal cancer. Patient interested in testing. Not having any changes in stool consistency or patterns, or bloody stools. Could consider cologuard. Discussed risks vs benefits, and discussed that at this time further testing would not be indicated given no direct relative with diagnosis and no active sxs     She had a right ACL repair July 2024, notices that the lateral side of calf is numb. Just stays in the area, doesn't radiate anywhere. Has an appointment with orthopedics     Health Screening: Exercises regularly, changed diet to incorporate veggies and fruit, last documented Pap smear was 1/12/23, which was normal. Up to date on mammogram, scattered areas of fibroglandular density and benign calcifications, but no suspicious masses. Per documentation from Feb 5, pap up to date.       Review of Systems   Constitutional:  Negative for chills and fever.   Eyes:  Negative for blurred vision.   Respiratory:  Negative for cough and shortness of breath.    Cardiovascular:  Negative for chest pain and leg swelling.   Gastrointestinal:  Negative for abdominal pain, constipation, diarrhea, nausea and vomiting.   Genitourinary:  Negative for dysuria.   Musculoskeletal:  Negative for myalgias.   Neurological:  Negative for sensory change, weakness and headaches.   Psychiatric/Behavioral:  The patient is not nervous/anxious.         PAST HISTORY:     No past medical history on file.    Past Surgical History:   Procedure Laterality Date    ECTOPIC PREGNANCY SURGERY         No family history on file.    Social History     Socioeconomic History    Marital  status:    Tobacco Use    Smoking status: Never    Smokeless tobacco: Never   Substance and Sexual Activity    Alcohol use: No    Drug use: Never    Sexual activity: Yes     Social Drivers of Health     Financial Resource Strain: Medium Risk (3/6/2025)    Overall Financial Resource Strain (CARDIA)     Difficulty of Paying Living Expenses: Somewhat hard   Food Insecurity: No Food Insecurity (3/6/2025)    Hunger Vital Sign     Worried About Running Out of Food in the Last Year: Never true     Ran Out of Food in the Last Year: Never true   Transportation Needs: No Transportation Needs (3/6/2025)    PRAPARE - Transportation     Lack of Transportation (Medical): No     Lack of Transportation (Non-Medical): No   Physical Activity: Insufficiently Active (3/6/2025)    Exercise Vital Sign     Days of Exercise per Week: 7 days     Minutes of Exercise per Session: 20 min   Stress: Stress Concern Present (3/6/2025)    [employer] - Occupational Stress Questionnaire     Feeling of Stress : To some extent   Housing Stability: Low Risk  (3/6/2025)    Housing Stability Vital Sign     Unable to Pay for Housing in the Last Year: No     Number of Times Moved in the Last Year: 0     Homeless in the Last Year: No       MEDICATIONS & ALLERGIES:     Current Outpatient Medications on File Prior to Visit   Medication Sig    doxycycline (VIBRAMYCIN) 100 MG Cap Take 100 mg by mouth 2 (two) times daily.    ibuprofen (ADVIL,MOTRIN) 800 MG tablet Take 1 tablet (800 mg total) by mouth every 8 (eight) hours as needed for Pain.    methocarbamoL (ROBAXIN) 750 MG Tab Take 1 tablet (750 mg total) by mouth every 6 (six) hours as needed (muscle spasms).    mupirocin (BACTROBAN) 2 % ointment Apply topically 2 (two) times daily.    phentermine (ADIPEX-P) 37.5 mg tablet Take 37.5 mg by mouth.     No current facility-administered medications on file prior to visit.       Review of patient's allergies indicates:   Allergen  "Reactions    Sulfamethoxazole-trimethoprim Hives       OBJECTIVE:        Vital Signs:  Vitals:    03/12/25 1318   BP: 118/72   Weight: 86.9 kg (191 lb 9.3 oz)   Height: 5' 6" (1.676 m)       Body mass index is 30.92 kg/m².     Physical Exam:  Physical Exam  Vitals reviewed.   Constitutional:       Appearance: Normal appearance.   HENT:      Head: Normocephalic and atraumatic.      Mouth/Throat:      Mouth: Mucous membranes are moist.      Pharynx: No oropharyngeal exudate.   Eyes:      Extraocular Movements: Extraocular movements intact.      Pupils: Pupils are equal, round, and reactive to light.   Cardiovascular:      Rate and Rhythm: Normal rate and regular rhythm.      Heart sounds: Normal heart sounds.   Pulmonary:      Effort: Pulmonary effort is normal.      Breath sounds: Normal breath sounds.   Abdominal:      General: Abdomen is flat.      Palpations: Abdomen is soft.      Tenderness: There is no abdominal tenderness.   Musculoskeletal:         General: Normal range of motion.      Cervical back: Normal range of motion.      Right lower leg: No edema.      Left lower leg: No edema.   Skin:     General: Skin is warm and dry.   Neurological:      Mental Status: She is alert and oriented to person, place, and time.   Psychiatric:         Mood and Affect: Mood normal.         Behavior: Behavior normal.            Laboratory  No results found for: "WBC", "HGB", "HCT", "MCV", "PLT"  No results found for: "GLU", "NA", "K", "CL", "CO2", "BUN", "CREATININE", "CALCIUM", "MG", "PROT", "BILITOT", "ALKPHOS", "ALT", "AST"  No results found for: "INR", "PROTIME"  No results found for: "CHOL", "HDL", "LDLCALC", "TRIG"  No results found for: "HGBA1C"  No results found for: "TSH"  No results for input(s): "POCTGLUCOSE" in the last 72 hours.       Health Maintenance         Date Due Completion Date    Cervical Cancer Screening Never done ---    Hemoglobin A1c (Diabetic Prevention Screening) Never done ---    Influenza " Vaccine (1) 09/01/2024 10/29/2023    COVID-19 Vaccine (4 - 2024-25 season) 09/01/2024 10/29/2023    Mammogram 10/24/2025 10/24/2024    TETANUS VACCINE 01/22/2034 1/22/2024    RSV Vaccine (Age 60+ and Pregnant patients) (1 - 1-dose 75+ series) 10/22/2059 ---                ASSESSMENT & PLAN:       Ms. Deena Mcdonald is a 39 y.o. female who was seen today in clinic for annual visit. Wanted to discuss colon cancer screening given grandmother was diagnosed at 45/46 years old. Discussed indication of testing with her, and at this time given no direct relative with diagnosis and no red flag sxs, would not indicate further testing. Otherwise, will obtain routine labwork as indicated as below.   Discussed lifestyle modifications with improvements in diet and exercise. All questions answered. In office handout given.    Diagnoses and all orders for this visit:    Preventative health care  -     CBC Auto Differential; Future  -     Comprehensive Metabolic Panel; Future  -     Lipid Panel; Future  -     HEMOGLOBIN A1C; Future  -     TSH; Future         1. Preventative health care        No follow-ups on file.    Other Orders Placed This Visit:  Orders Placed This Encounter   Procedures    CBC Auto Differential    Comprehensive Metabolic Panel    Lipid Panel    HEMOGLOBIN A1C    TSH             Discussed with Dr. Gonzalez - staff attestation to follow    Vanessa Pelaez MD  Internal Medicine, PGY-3  03/12/2025.12:59 PM  [facility]  Internal Medicine Resident Clinic  [address] [place] [id number]  [phone]  [email or web address]

## 2025-03-18 ENCOUNTER — RESULTS FOLLOW-UP (OUTPATIENT)
Dept: INTERNAL MEDICINE | Facility: CLINIC | Age: 40
End: 2025-03-18

## 2025-07-28 ENCOUNTER — LAB VISIT (OUTPATIENT)
Dept: LAB | Facility: HOSPITAL | Age: 40
End: 2025-07-28
Payer: COMMERCIAL

## 2025-07-28 ENCOUNTER — OFFICE VISIT (OUTPATIENT)
Dept: INTERNAL MEDICINE | Facility: CLINIC | Age: 40
End: 2025-07-28
Payer: COMMERCIAL

## 2025-07-28 VITALS
HEIGHT: 66 IN | WEIGHT: 206.81 LBS | HEART RATE: 84 BPM | SYSTOLIC BLOOD PRESSURE: 110 MMHG | OXYGEN SATURATION: 97 % | DIASTOLIC BLOOD PRESSURE: 76 MMHG | BODY MASS INDEX: 33.24 KG/M2

## 2025-07-28 DIAGNOSIS — Z76.89 ENCOUNTER TO ESTABLISH CARE: Primary | ICD-10-CM

## 2025-07-28 DIAGNOSIS — R73.03 PRE-DIABETES: ICD-10-CM

## 2025-07-28 DIAGNOSIS — R07.9 CHEST PAIN, UNSPECIFIED TYPE: ICD-10-CM

## 2025-07-28 DIAGNOSIS — Z83.719 FAMILY HISTORY OF POLYPS IN THE COLON: ICD-10-CM

## 2025-07-28 LAB
EAG (OHS): 131 MG/DL (ref 68–131)
HBA1C MFR BLD: 6.2 % (ref 4–5.6)

## 2025-07-28 PROCEDURE — 3008F BODY MASS INDEX DOCD: CPT | Mod: CPTII,S$GLB,, | Performed by: INTERNAL MEDICINE

## 2025-07-28 PROCEDURE — 1159F MED LIST DOCD IN RCRD: CPT | Mod: CPTII,S$GLB,, | Performed by: INTERNAL MEDICINE

## 2025-07-28 PROCEDURE — 3044F HG A1C LEVEL LT 7.0%: CPT | Mod: CPTII,S$GLB,, | Performed by: INTERNAL MEDICINE

## 2025-07-28 PROCEDURE — 83036 HEMOGLOBIN GLYCOSYLATED A1C: CPT

## 2025-07-28 PROCEDURE — 99999 PR PBB SHADOW E&M-EST. PATIENT-LVL IV: CPT | Mod: PBBFAC,,,

## 2025-07-28 PROCEDURE — 36415 COLL VENOUS BLD VENIPUNCTURE: CPT

## 2025-07-28 PROCEDURE — 3078F DIAST BP <80 MM HG: CPT | Mod: CPTII,S$GLB,, | Performed by: INTERNAL MEDICINE

## 2025-07-28 PROCEDURE — 99213 OFFICE O/P EST LOW 20 MIN: CPT | Mod: S$GLB,,, | Performed by: INTERNAL MEDICINE

## 2025-07-28 PROCEDURE — 3074F SYST BP LT 130 MM HG: CPT | Mod: CPTII,S$GLB,, | Performed by: INTERNAL MEDICINE

## 2025-07-28 NOTE — PROGRESS NOTES
INTERNAL MEDICINE RESIDENT CLINIC  CLINIC NOTE  Patient Name: Deena Mcdonald  YOB: 1985  Chief Complaint: Establish care    PRESENTING HISTORY       History of Present Illness:  Ms. Deena Mcdonald is a 39 y.o. female w/ history of pre-eclampsia (2015), ACL repair (June 2024) who is presenting to [facility]. She does report that she has intermittent CP that started this past week. She reports that she notices that sometimes it's hard to catch her breath when she takes a deep one. She reports no fevers, abdominal pain, n/v, or headaches. Patient does report she recently started to do pilattes and that's when she noticed her pain start. Patient does report she is bothered by evidence of varicose veins on her RLE.     Does have a family history of polyps and colon cancer. Her mom had > 20 polyps noted on recent colonoscopy and grandmother passed away from colon cancer.     Social history:  - Occupation: Works at [employer], [employer] department   - Smoking: denies  - Alcohol: Social drinker, once every 6 months   - Illicit drug use: denies  - Sexually Active: Monogamous relationship   - Safe at home: confirms    Family History:     Maternal Grandmother: 63 year old colon cancer (passed away)   Mother: > 20 polyps removed at the age of 50, pre diabetic   Father: No known medical history   Sister: Diabetic, HLD   Brother: No known medical conditions       - Breast cancer screen (Women aged 50-74): Getting another mammogram end of the year  - Cervical cancer screen (Women aged 21-65): February 2025       ROS    PAST HISTORY:   No past medical history on file.    Past Surgical History:   Procedure Laterality Date    ECTOPIC PREGNANCY SURGERY         No family history on file.    Social History     Socioeconomic History    Marital status:    Tobacco Use    Smoking status: Never    Smokeless tobacco: Never   Substance and Sexual Activity    Alcohol use: No    Drug use: Never     Sexual activity: Yes     Social Drivers of Health     Financial Resource Strain: Medium Risk (3/6/2025)    Overall Financial Resource Strain (CARDIA)     Difficulty of Paying Living Expenses: Somewhat hard   Food Insecurity: No Food Insecurity (3/6/2025)    Hunger Vital Sign     Worried About Running Out of Food in the Last Year: Never true     Ran Out of Food in the Last Year: Never true   Transportation Needs: No Transportation Needs (3/6/2025)    PRAPARE - Transportation     Lack of Transportation (Medical): No     Lack of Transportation (Non-Medical): No   Physical Activity: Insufficiently Active (3/6/2025)    Exercise Vital Sign     Days of Exercise per Week: 7 days     Minutes of Exercise per Session: 20 min   Stress: Stress Concern Present (3/6/2025)    [employer] - Occupational Stress Questionnaire     Feeling of Stress : To some extent   Housing Stability: Low Risk  (3/6/2025)    Housing Stability Vital Sign     Unable to Pay for Housing in the Last Year: No     Number of Times Moved in the Last Year: 0     Homeless in the Last Year: No       MEDICATIONS & ALLERGIES:     Current Outpatient Medications on File Prior to Visit   Medication Sig    doxycycline (VIBRAMYCIN) 100 MG Cap Take 100 mg by mouth 2 (two) times daily.    ibuprofen (ADVIL,MOTRIN) 800 MG tablet Take 1 tablet (800 mg total) by mouth every 8 (eight) hours as needed for Pain.    methocarbamoL (ROBAXIN) 750 MG Tab Take 1 tablet (750 mg total) by mouth every 6 (six) hours as needed (muscle spasms).    mupirocin (BACTROBAN) 2 % ointment Apply topically 2 (two) times daily.    phentermine (ADIPEX-P) 37.5 mg tablet Take 37.5 mg by mouth.     No current facility-administered medications on file prior to visit.       Review of patient's allergies indicates:   Allergen Reactions    Sulfamethoxazole-trimethoprim Hives       OBJECTIVE:   Vital Signs:  Vitals:    07/28/25 1604   BP: 110/76   Pulse: 84   SpO2: 97%   Weight:  "93.8 kg (206 lb 12.7 oz)   Height: 5' 6" (1.676 m)        Physical Exam  Constitutional:       Appearance: Normal appearance.   HENT:      Head: Normocephalic and atraumatic.   Cardiovascular:      Rate and Rhythm: Normal rate and regular rhythm.      Pulses: Normal pulses.      Heart sounds: Normal heart sounds.   Pulmonary:      Effort: Pulmonary effort is normal.      Breath sounds: Normal breath sounds.   Abdominal:      General: Bowel sounds are normal.      Palpations: Abdomen is soft.   Musculoskeletal:         General: Tenderness (TTP to the chest area) present.   Skin:     Comments: RLE varicose veins    Neurological:      Mental Status: She is alert.       ASSESSMENT & PLAN:     Deena Myers" was seen today for chest discomfort.    Diagnoses and all orders for this visit:    Encounter to establish care    Pre-diabetes  -     Hemoglobin A1C; Future    Chest pain, unspecified type  -     US Lower Extremity Veins Right; Future    Family history of polyps in the colon  -     Ambulatory referral/consult to Endo Procedure ; Future    Discussed that we would obtain a RLE US to rule out a DVT in the setting of recent ACL repair, varicose veins and CP. Do not suspect PE at this time due to reproducibility of pain on examination. But will obtian US to confirm no DVT    Will repeat A1c to assess need to start metformin for weight loss/prediabetes     Colonoscopy referral for high risk features in the family       Health Maintenance         Date Due Completion Date    Cervical Cancer Screening Never done ---    COVID-19 Vaccine (4 - 2024-25 season) 09/01/2024 10/29/2023    Mammogram 10/24/2025 10/24/2024    Influenza Vaccine (1) 09/01/2025 10/29/2023    Hemoglobin A1c (Diabetic Prevention Screening) 03/12/2028 3/12/2025    TETANUS VACCINE 01/22/2034 1/22/2024    RSV Vaccine (Age 60+ and Pregnant patients) (1 - 1-dose 75+ series) 10/22/2059 ---            Discussed with Dr. Pena - staff attestation to " follow    RTC 6 months     Ashlyn Khan DO  Internal Medicine PGY-3  [facility]  [address] [place] [address]

## 2025-07-29 ENCOUNTER — TELEPHONE (OUTPATIENT)
Dept: INTERNAL MEDICINE | Facility: CLINIC | Age: 40
End: 2025-07-29
Payer: COMMERCIAL

## 2025-07-29 ENCOUNTER — TELEPHONE (OUTPATIENT)
Dept: ENDOSCOPY | Facility: HOSPITAL | Age: 40
End: 2025-07-29

## 2025-07-29 ENCOUNTER — CLINICAL SUPPORT (OUTPATIENT)
Dept: ENDOSCOPY | Facility: HOSPITAL | Age: 40
End: 2025-07-29
Payer: COMMERCIAL

## 2025-07-29 VITALS — BODY MASS INDEX: 33.11 KG/M2 | WEIGHT: 206 LBS | HEIGHT: 66 IN

## 2025-07-29 DIAGNOSIS — R73.03 PRE-DIABETES: Primary | ICD-10-CM

## 2025-07-29 DIAGNOSIS — Z83.719 FAMILY HISTORY OF POLYPS IN THE COLON: ICD-10-CM

## 2025-07-29 DIAGNOSIS — Z12.11 SPECIAL SCREENING FOR MALIGNANT NEOPLASMS, COLON: Primary | ICD-10-CM

## 2025-07-29 RX ORDER — POLYETHYLENE GLYCOL 3350, SODIUM SULFATE ANHYDROUS, SODIUM BICARBONATE, SODIUM CHLORIDE, POTASSIUM CHLORIDE 236; 22.74; 6.74; 5.86; 2.97 G/4L; G/4L; G/4L; G/4L; G/4L
4 POWDER, FOR SOLUTION ORAL ONCE
Qty: 4000 ML | Refills: 0 | Status: SHIPPED | OUTPATIENT
Start: 2025-07-29 | End: 2025-07-29

## 2025-07-29 NOTE — PLAN OF CARE
Patient is scheduled for a Colonoscopy on 78/21/2025 with Dr. LUZ MARIA Lema  Referral for procedure from PAT appointment

## 2025-07-29 NOTE — PATIENT INSTRUCTIONS
[facility] Prep and Procedure Instructions  Polyethylene Glycol (PEG) Solution    Date of procedure: 8/21/2025 - Arrive at 9:30 AM  Location of Department:   [facility] [address] [place] [id number]  Take the Elevators to 2nd Floor Endoscopy Procedural Area    Getting ready for your procedure:    If your procedure requires the administration of anesthesia, it is necessary for a responsible adult to drive you home. The designated adult is strongly encouraged to remain in the endoscopy area until the patient is discharged. (Medical Transportation, Uber, Lyft, Taxi, etc. may ONLY be used if a responsible adult is present to accompany you home. The responsible adult CANNOT be the  of the service.)   person must be available to return to pick you up within 15 minutes of being notified of discharge.  Please bring a picture ID, insurance card, & co-pay.  Leave all jewelry and valuables at home on the day of the procedure.  Do not follow the instructions that come in the prep box - use these instructions instead.    Medications:    If you begin taking any new blood thinning, weight loss, or diabetic medications please call Endoscopy Scheduling as soon as possible at [phone].  If you take heart, blood pressure, seizure, pain, (including inhalers/nebulizers), anti-rejection (transplant patients) or mental health medications, please take at your regular times with a sip of water.  If you are taking diabetic or weight loss medications, see the attached instructions.      COLONOSCOPY PREP TIMELINE    ONE WEEK PRIOR TO PROCEDURE   the prep kit and Dulcolax laxative tablets (bisacodyl 5mg - available over the counter) from your local pharmacy.  Purchase clear liquids for use during the prep referencing the chart below.    CLEAR LIQUIDS NOT CLEAR LIQUIDS (DO NOT DRINK)   Water X Milk   Clear broth (chicken, beef, or vegetable) X Smoothies   Apple Juice (no  pulp) X Hot Chocolate   White grape juice X Juices with pulp (orange juice, prune juice)   Sports drinks (AVOID red, purple, or blue) X Coffee WITH cream or milk   Clear soda X Beverages with particles (shakes, milkshakes)   Tea or coffee (black, NO MILK)    Clear gelatin (Jell-o, AVOID red, purple, or blue)      THE DAY BEFORE YOUR PROCEDURE: 8/20/2025    Consume ONLY CLEAR LIQUIDS for the entire day.  Stay hydrated.  NO SOLID FOOD    2:00 PM  Mix the PEG solution as directed on container and place in the refrigerator.  Take four (4) Dulcolax laxative (bisacodyl 5mg) tablets with at least one full glass of clear liquids.      6:00 - 7:00 PM  Drink half the liquid in the container within one (1) hour.  Drink an 8-ounce glass of the mixture every 10 to 20 minutes until half is gone.  Refrigerate the remaining half of the liquid for dose 2.   If you experience nausea, bloating, or cramping, slow down drinking until your symptoms decrease.    THE DAY OF YOUR PROCEDURE: 8/21/2025    2:00 - 3:00 AM  Drink remaining half of the liquid in the container within one (1) hour.  Drink an 8-ounce glass of the mixture every 10 to 20 minutes.    ** May continue to drink clear liquids until 4 hours prior to arrival time for procedure **    Who to call if you have questions:    To reschedule / cancel, or for prep questions: [phone] / Hours of operation Monday-Friday 8:00 AM-4:30 PM  Insurance or financial information: [phone] or [phone]  After hours concerns, call [facility] at [phone] or [phone]    Please watch this informational video:  [email or web address]      Please contact your Diabetes Care Provider if you have questions or are preparing for more than one day before your procedure. For any scheduling questions, contact the Endoscopy Schedulers at [phone]. Please disregard this guide if you do not take any of these medications.   Weight loss and  Diabetes Medication Holding Instructions:         Oral Medicine   Day before procedure Day of Procedure            Glyburide       Do NOT take morning of procedure. If you        Glucotrol (Glipizide)   Do NOT take   take twice daily, take with dinner.        Amaryl (Glimepiride)                                     Glucophage       Do NOT take morning of procedure. Take        Glumetza   Take as   when you start eating again.          Fortamet (Metformin)   prescribed                                 Januvia (Sitaglipitin)       Do NOT take morning of procedure. Take        Nesina (Aloglipitin)   Take as   when you start eating again.          Onglyza (Saxaglipitin)   prescribed                  Tradjenta (Linaglipitin)                                     Invokana (canagliflozin)                    Farxiga (dapagliflozin)   see prep    Do NOT take morning of procedure. Take        Jardiance (empagliflozin) instructions   when you start eating again.          Steglatro (ertugliflozin)   for date of                  Brenzavvy (bexagliflozin) last dose                  Inpefa (sotagliflozin)                      Invokamet/Invokamet XR (Invokana + metformin)                    Xigduo (Farxiga + metformin)                    Synjardy  XR (Jardiance + metformin)                    Segluromet (Steglatro + metformin)                    Qtern (Farxiga + saxagliptin)                    Glyxambi (Jardiance + linagliptin)                    Steglujan (Steglatro + sitagliptin)                                   Actos (Pioglitazone)   Take as   Do NOT take morning of procedure. Take            prescribed.   when you start eating again.                         Rybelsus (semaglutide) Take as    Do NOT take morning of procedure. Take             prescribed.   when you start eating again.                          Adipex/Lomaria (phentermine) see prep   Do NOT take morning of procedure. Take        Qsymia (phentermine  + topiramate)  instructions   when you start eating again.              for date of                      last dose                               Tenuate/Tepanil (diethylpropion) see prep   Do NOT take morning of procedure. Take           instructions   when you start eating again.             for date of                     last dose                              Xenical (orilstat)   see prep    Do NOT take morning of procedure. Take           instructions   when you start eating again.             for date of                     last dose                                Injectable & Combination Day before Procedure Day of Procedure            Medicine (taken daily)                      Victoza/Saxenda (liragluide) Take as prescribed Do NOT take morning of procedure. Take        Byetta (exenatide)       when you start eating again.          Gattex (teduglutide)                      Adlyxin (lixisenatide) *                      Soliqua (lixisenatide + insulin glargine)                    Xultophy (liraglutide + insulin degludec)                                    Injectable  Medicine   Day before Procedure Day of Procedure             (taken weekly)                      Bydureon bcise (exenatide ER) see prep   Do NOT take morning of procedure. Take         Mounjaro/Zepbound (tirzepatide) instructions   when you start eating again.          Ozempic/Wegovy (semaglutide) for date of                  Tanzeum (albiglutide) * last dose                  Trulicity (dulaglutide)                      It is important to monitor your blood sugar while doing the bowel preparation. On the day of your bowel prep, when you are on a clear liquid diet, you may drink beverages with sugar as your source of glucose. Be sure to mix the prep with water or sugar free liquid only. Below are instructions on how to adjust your diabetic medications prior to your scheduled procedure. Call the healthcare provider who manages your diabetes if you have questions.       Insulin for Type 1 Diabetes Mellitus   Day before Procedure                                        Day of procedure         Basaglar                         If you use in the morning, take as prescribed.    If you take in the morning,       Lantus                         If you use in the evening, inject 70% of dose.   inject 80% of dose. If you take      Levemir           in the evenings, inject usual       Toujeo           dose.           Tresiba                      Alix Robersona                      Count carbs and adjust dose           Do NOT take morining of procedure.       Apidra                      accordingly. If not carb counting,         Take when you start eating again.       Humalog 100                      take 25% of usual meal dose.                  Humalog 200                      May use correction dose every                 Novolog                      4 hours. Do NOT use once sugar-free                                       liquid prep is started.                                 Insulin Pump                     SEE BELOW PUMP GUIDANCE                                                                                                                Insulin for Type 2 Diabetes Mellitus   Day before Procedure                                        Day of procedure         Basaglar                       If you use in the morning, take as prescribed.    If you take in the morning,       Lantus                       If you use in the evening, inject 70% of dose.   inject 80% of dose. If you take      Levemir           in the evenings, inject usual       Toujeo           dose.           Jacki Mcclure                       Inject 50 % of dose with clear liquid diet.      Do NOT take morning of       Apidra                      Do NOT use once sugar-free clear liquid prep   procedure. Take when you        Fiasp                      is started. If you are using a correction scale,    start eating meals again.       Humalog 100                      take dose every 4 hours as needed.                 Humalog 200                      Novolog                                     NPH                        Inject 50% of breakfast and dinner      Do NOT take morning of                             doses with clear liquid diet.        procedure. Take usual dose                  when you eat dinner.                      Regular                       Inject 50% of breakfast dose and do NOT take   Do NOT take morning of                             dinner dose once sugar-free liquid prep has    procedure. Take usual dose                             started. If using correction scale, may take dose   when you eat dinner.                             every 6 hours as needed.                                  Novolog Mix 70/30                     Inject 50% of breakfast dose. Inject 25% of dinner dose.     Do NOT take breakfast dose.       Humolog Mix 75/25                              Take usual dose when you eat       Humolog Mix 50/50           dinner.                          U500                      Take 50% of AM or breakfast unit alvaro dose.   Do NOT take mornining of                              Take 25% of lunch or dinner or PM unit alvaro dose.    procedure. Take when you                   start eating again.                        V-Go                       Continue to wear your V-Go device. Do NOT click once sugar-free   clear liquid prep is started.   Continue to wear your V-Go                                device. Resume clicks with                   meals.                                         INSULIN PUMP GUIDANCE Day before Procedure Day of Procedure   Pump and CGM do not communicate      Count carbs and bolus for carbs and correction as needed. May use temp basal rate of 70% once sugar-free clear prep is started.  "Continue until after procedure the following day Continue use of 70% temp basal rate until procedure complete and you are eating normally        Pump and CGM do communicate     Tandem Tslim and Mobi Count carbs and bolus for carbs and correction as needed. Start "Exercise" mode once sugar-free clear prep is started. Continue until after procedure the following day Continue use of "Exercise" mode until procedure complete and you are eating normally        Omnipod 5 Count carbs and bolus for carbs and correction as needed. Start "Activity" mode once sugar-free clear prep is started. Continue until after procedure the following day Continue use of "Activity" mode until procedure complete and you are eating normally        Medtronic 670g/770g/780g Count carbs and bolus for carbs and correction as needed. Start "Temp target" mode once sugar-free clear prep is started. Continue until after procedure the following day Continue use of temp target until procedure complete and you are eating normally             Beta Bionic ilet Count carbs and bolus for carbs and correction as needed. Let pump run as you usually would Let pump run as you usually would         "

## 2025-07-29 NOTE — TELEPHONE ENCOUNTER
Was reviewing her lab   Results and was concerned about her A1C levels   Please call and review lab

## 2025-07-30 ENCOUNTER — TELEPHONE (OUTPATIENT)
Dept: INTERNAL MEDICINE | Facility: CLINIC | Age: 40
End: 2025-07-30
Payer: COMMERCIAL

## 2025-07-30 DIAGNOSIS — R73.03 PRE-DIABETES: Primary | ICD-10-CM

## 2025-07-30 NOTE — TELEPHONE ENCOUNTER
Discussed with patient that she is still in pre diabetic range. Discussed making lifestyle changes incorporating mediterranean diet and at least 150 mins of exercise per week and we will recheck an A1c in 2 months time to assess changes. If she has worsening A1c we will plan on starting metformin. Patient verbalized understanding and had no further questions.

## 2025-07-30 NOTE — TELEPHONE ENCOUNTER
Copied from CRM #[id number]. Topic: General Inquiry - Return Call  >> Jul 30, 2025  1:19 PM Delano wrote:  .Type:  Needs Medical Advice    Who Called: pt    Would the patient rather a call back or a response via [facility]? Call back   Best Call Back Number: [phone]  Additional Information:      Pt stated she would like a call back about getting a referral for a nutritionist

## 2025-07-31 ENCOUNTER — ANESTHESIA EVENT (OUTPATIENT)
Dept: ENDOSCOPY | Facility: HOSPITAL | Age: 40
End: 2025-07-31
Payer: COMMERCIAL

## 2025-08-08 ENCOUNTER — HOSPITAL ENCOUNTER (OUTPATIENT)
Dept: RADIOLOGY | Facility: HOSPITAL | Age: 40
Discharge: HOME | End: 2025-08-08
Payer: COMMERCIAL

## 2025-08-08 DIAGNOSIS — R07.9 CHEST PAIN, UNSPECIFIED TYPE: ICD-10-CM

## 2025-08-08 PROCEDURE — 93971 EXTREMITY STUDY: CPT | Mod: TC,RT

## 2025-08-08 PROCEDURE — 93971 EXTREMITY STUDY: CPT | Mod: 26,RT,, | Performed by: RADIOLOGY

## 2025-08-14 ENCOUNTER — TELEPHONE (OUTPATIENT)
Dept: ENDOSCOPY | Facility: HOSPITAL | Age: 40
End: 2025-08-14
Payer: COMMERCIAL

## 2025-08-21 ENCOUNTER — HOSPITAL ENCOUNTER (OUTPATIENT)
Facility: HOSPITAL | Age: 40
Discharge: HOME | End: 2025-08-21
Attending: INTERNAL MEDICINE | Admitting: INTERNAL MEDICINE
Payer: COMMERCIAL

## 2025-08-21 ENCOUNTER — ANESTHESIA (OUTPATIENT)
Dept: ENDOSCOPY | Facility: HOSPITAL | Age: 40
End: 2025-08-21
Payer: COMMERCIAL

## 2025-08-21 VITALS
OXYGEN SATURATION: 100 % | RESPIRATION RATE: 20 BRPM | HEIGHT: 65 IN | BODY MASS INDEX: 34.32 KG/M2 | DIASTOLIC BLOOD PRESSURE: 73 MMHG | HEART RATE: 69 BPM | SYSTOLIC BLOOD PRESSURE: 130 MMHG | WEIGHT: 206 LBS | TEMPERATURE: 98 F

## 2025-08-21 DIAGNOSIS — Z83.719 FAMILY HISTORY OF POLYPS IN THE COLON: ICD-10-CM

## 2025-08-21 DIAGNOSIS — Z12.11 SCREENING FOR COLON CANCER: ICD-10-CM

## 2025-08-21 DIAGNOSIS — Z80.0 FAMILY HISTORY OF COLON CANCER: Primary | ICD-10-CM

## 2025-08-21 PROCEDURE — 88305 TISSUE EXAM BY PATHOLOGIST: CPT | Mod: TC | Performed by: INTERNAL MEDICINE

## 2025-08-21 PROCEDURE — 37000008 HC ANESTHESIA 1ST 15 MINUTES: Performed by: INTERNAL MEDICINE

## 2025-08-21 PROCEDURE — 94761 N-INVAS EAR/PLS OXIMETRY MLT: CPT

## 2025-08-21 PROCEDURE — 45385 COLONOSCOPY W/LESION REMOVAL: CPT | Mod: 33 | Performed by: INTERNAL MEDICINE

## 2025-08-21 PROCEDURE — 25000003 PHARM REV CODE 250: Performed by: INTERNAL MEDICINE

## 2025-08-21 PROCEDURE — 37000009 HC ANESTHESIA EA ADD 15 MINS: Performed by: INTERNAL MEDICINE

## 2025-08-21 PROCEDURE — 27201089 HC SNARE, DISP (ANY): Performed by: INTERNAL MEDICINE

## 2025-08-21 PROCEDURE — 45385 COLONOSCOPY W/LESION REMOVAL: CPT | Mod: 33,,, | Performed by: INTERNAL MEDICINE

## 2025-08-21 PROCEDURE — 63600175 PHARM REV CODE 636 W HCPCS: Performed by: NURSE ANESTHETIST, CERTIFIED REGISTERED

## 2025-08-21 PROCEDURE — 99900035 HC TECH TIME PER 15 MIN (STAT)

## 2025-08-21 RX ORDER — SODIUM CHLORIDE 9 MG/ML
INJECTION, SOLUTION INTRAVENOUS CONTINUOUS
Status: DISCONTINUED | OUTPATIENT
Start: 2025-08-21 | End: 2025-08-21 | Stop reason: HOSPADM

## 2025-08-21 RX ORDER — LIDOCAINE HYDROCHLORIDE 20 MG/ML
INJECTION, SOLUTION EPIDURAL; INFILTRATION; INTRACAUDAL; PERINEURAL
Status: DISCONTINUED | OUTPATIENT
Start: 2025-08-21 | End: 2025-08-21

## 2025-08-21 RX ORDER — PROPOFOL 10 MG/ML
VIAL (ML) INTRAVENOUS CONTINUOUS PRN
Status: DISCONTINUED | OUTPATIENT
Start: 2025-08-21 | End: 2025-08-21

## 2025-08-21 RX ORDER — PROPOFOL 10 MG/ML
VIAL (ML) INTRAVENOUS
Status: DISCONTINUED | OUTPATIENT
Start: 2025-08-21 | End: 2025-08-21

## 2025-08-21 RX ADMIN — SODIUM CHLORIDE: 0.9 INJECTION, SOLUTION INTRAVENOUS at 11:08

## 2025-08-21 RX ADMIN — LIDOCAINE HYDROCHLORIDE 60 MG: 20 INJECTION, SOLUTION EPIDURAL; INFILTRATION; INTRACAUDAL; PERINEURAL at 11:08

## 2025-08-21 RX ADMIN — PROPOFOL 150 MCG/KG/MIN: 10 INJECTION, EMULSION INTRAVENOUS at 11:08

## 2025-08-21 RX ADMIN — PROPOFOL 100 MG: 10 INJECTION, EMULSION INTRAVENOUS at 11:08

## 2025-08-26 LAB
ESTROGEN SERPL-MCNC: NORMAL PG/ML
INSULIN SERPL-ACNC: NORMAL U[IU]/ML
LAB AP CLINICAL INFORMATION: NORMAL
LAB AP GROSS DESCRIPTION: NORMAL
LAB AP PERFORMING LOCATION(S): NORMAL
LAB AP REPORT FOOTNOTES: NORMAL
T3RU NFR SERPL: NORMAL %

## 2025-08-27 ENCOUNTER — TELEPHONE (OUTPATIENT)
Dept: GASTROENTEROLOGY | Facility: CLINIC | Age: 40
End: 2025-08-27
Payer: COMMERCIAL